# Patient Record
Sex: MALE | Race: OTHER | HISPANIC OR LATINO | URBAN - METROPOLITAN AREA
[De-identification: names, ages, dates, MRNs, and addresses within clinical notes are randomized per-mention and may not be internally consistent; named-entity substitution may affect disease eponyms.]

---

## 2019-11-18 ENCOUNTER — APPOINTMENT (OUTPATIENT)
Age: 69
Setting detail: DERMATOLOGY
End: 2019-11-19

## 2019-11-18 DIAGNOSIS — N48.1 BALANITIS: ICD-10-CM

## 2019-11-18 DIAGNOSIS — L90.0 LICHEN SCLEROSUS ET ATROPHICUS: ICD-10-CM

## 2019-11-18 PROCEDURE — OTHER COUNSELING: OTHER

## 2019-11-18 PROCEDURE — OTHER MIPS QUALITY: OTHER

## 2019-11-18 PROCEDURE — 99202 OFFICE O/P NEW SF 15 MIN: CPT

## 2019-11-18 PROCEDURE — OTHER PRESCRIPTION: OTHER

## 2019-11-18 RX ORDER — CLOBETASOL PROPIONATE 0.5 MG/G
CREAM TOPICAL
Qty: 1 | Refills: 1 | Status: ERX | COMMUNITY
Start: 2019-11-18

## 2019-11-18 ASSESSMENT — LOCATION DETAILED DESCRIPTION DERM
LOCATION DETAILED: DISTAL VENTRAL PENILE SHAFT
LOCATION DETAILED: MID-VENTRAL PENILE SHAFT

## 2019-11-18 ASSESSMENT — LOCATION SIMPLE DESCRIPTION DERM
LOCATION SIMPLE: VENTRAL PENIS
LOCATION SIMPLE: VENTRAL PENIS

## 2019-11-18 ASSESSMENT — LOCATION ZONE DERM
LOCATION ZONE: PENIS
LOCATION ZONE: PENIS

## 2019-11-18 NOTE — PROCEDURE: MIPS QUALITY
Detail Level: Detailed
Quality 111:Pneumonia Vaccination Status For Older Adults: Pneumococcal Vaccination Administered
Quality 226: Preventive Care And Screening: Tobacco Use: Screening And Cessation Intervention: Patient screened for tobacco use and is an ex/non-smoker
Quality 110: Preventive Care And Screening: Influenza Immunization: Influenza Immunization Administered during Influenza season

## 2020-01-06 ENCOUNTER — APPOINTMENT (OUTPATIENT)
Age: 70
Setting detail: DERMATOLOGY
End: 2020-01-10

## 2020-01-06 DIAGNOSIS — N48.1 BALANITIS: ICD-10-CM

## 2020-01-06 PROCEDURE — OTHER COUNSELING: OTHER

## 2020-01-06 PROCEDURE — OTHER TREATMENT REGIMEN: OTHER

## 2020-01-06 PROCEDURE — OTHER MIPS QUALITY: OTHER

## 2020-01-06 PROCEDURE — 99213 OFFICE O/P EST LOW 20 MIN: CPT

## 2020-01-06 ASSESSMENT — LOCATION ZONE DERM: LOCATION ZONE: PENIS

## 2020-01-06 ASSESSMENT — LOCATION DETAILED DESCRIPTION DERM: LOCATION DETAILED: DISTAL VENTRAL PENILE SHAFT

## 2020-01-06 ASSESSMENT — LOCATION SIMPLE DESCRIPTION DERM: LOCATION SIMPLE: VENTRAL PENIS

## 2020-01-06 NOTE — PROCEDURE: TREATMENT REGIMEN
Plan: Offered patient to do a biopsy, but patient declined.  I explained to the patient that a persistent ulcer can be a sign of skin cancer and he still denied the biopsy at this time stating he would like to see what a different urologist opinion is..  he states that all of this happened after his urologic surgery. \\n\\n\\nReferred patient to an Urologist for further evaluation. Dr. Raymundo Hall or Abdifatah Diaz MD [phone] Plan: Offered patient to do a biopsy, but patient declined.  I explained to the patient that a persistent ulcer can be a sign of skin cancer and he still denied the biopsy at this time stating he would like to see what a different urologist opinion is..  he states that all of this happened after his urologic surgery. \\n\\n\\nReferred patient to an Urologist for further evaluation. Dr. Raymundo Hall or Abdifatah Diaz MD [phone]

## 2020-01-06 NOTE — PROCEDURE: MIPS QUALITY
Quality 226: Preventive Care And Screening: Tobacco Use: Screening And Cessation Intervention: Patient screened for tobacco use and is an ex/non-smoker
Quality 110: Preventive Care And Screening: Influenza Immunization: Influenza Immunization Administered during Influenza season
Detail Level: Detailed
Quality 111:Pneumonia Vaccination Status For Older Adults: Pneumococcal Vaccination Previously Received